# Patient Record
Sex: MALE
[De-identification: names, ages, dates, MRNs, and addresses within clinical notes are randomized per-mention and may not be internally consistent; named-entity substitution may affect disease eponyms.]

---

## 2019-04-24 ENCOUNTER — HOSPITAL ENCOUNTER (EMERGENCY)
Dept: HOSPITAL 14 - H.ER | Age: 29
Discharge: HOME | End: 2019-04-24
Payer: COMMERCIAL

## 2019-04-24 VITALS
RESPIRATION RATE: 14 BRPM | HEART RATE: 67 BPM | SYSTOLIC BLOOD PRESSURE: 115 MMHG | TEMPERATURE: 98.8 F | DIASTOLIC BLOOD PRESSURE: 66 MMHG

## 2019-04-24 VITALS — OXYGEN SATURATION: 99 %

## 2019-04-24 DIAGNOSIS — R07.89: Primary | ICD-10-CM

## 2019-04-24 LAB
ALBUMIN SERPL-MCNC: 4.7 G/DL (ref 3.5–5)
ALBUMIN/GLOB SERPL: 1.8 {RATIO} (ref 1–2.1)
ALT SERPL-CCNC: 38 U/L (ref 21–72)
AST SERPL-CCNC: 36 U/L (ref 17–59)
BASOPHILS # BLD AUTO: 0 K/UL (ref 0–0.2)
BASOPHILS NFR BLD: 0.5 % (ref 0–2)
BUN SERPL-MCNC: 16 MG/DL (ref 9–20)
CALCIUM SERPL-MCNC: 10.1 MG/DL (ref 8.4–10.2)
EOSINOPHIL # BLD AUTO: 0.1 K/UL (ref 0–0.7)
EOSINOPHIL NFR BLD: 2.1 % (ref 0–4)
ERYTHROCYTE [DISTWIDTH] IN BLOOD BY AUTOMATED COUNT: 13.5 % (ref 11.5–14.5)
GFR NON-AFRICAN AMERICAN: > 60
HGB BLD-MCNC: 14.5 G/DL (ref 12–18)
LYMPHOCYTES # BLD AUTO: 1.5 K/UL (ref 1–4.3)
LYMPHOCYTES NFR BLD AUTO: 27.1 % (ref 20–40)
MCH RBC QN AUTO: 29.9 PG (ref 27–31)
MCHC RBC AUTO-ENTMCNC: 34.1 G/DL (ref 33–37)
MCV RBC AUTO: 87.7 FL (ref 80–94)
MONOCYTES # BLD: 0.4 K/UL (ref 0–0.8)
MONOCYTES NFR BLD: 6.9 % (ref 0–10)
NEUTROPHILS # BLD: 3.5 K/UL (ref 1.8–7)
NEUTROPHILS NFR BLD AUTO: 63.4 % (ref 50–75)
NRBC BLD AUTO-RTO: 0 % (ref 0–0)
PLATELET # BLD: 174 K/UL (ref 130–400)
PMV BLD AUTO: 8.7 FL (ref 7.2–11.7)
RBC # BLD AUTO: 4.84 MIL/UL (ref 4.4–5.9)
WBC # BLD AUTO: 5.5 K/UL (ref 4.8–10.8)

## 2019-04-24 NOTE — ED PDOC
HPI: Chest Pain


Time Seen by Provider: 04/24/19 21:43


Chief Complaint (Nursing): Chest Pain


Chief Complaint (Provider): chest pain


History Per: Patient


History/Exam Limitations: no limitations


Onset/Duration Of Symptoms: Days (2), Waxing/Waning


Current Symptoms Are (Timing): Still Present


Quality: Sharp


Additional Complaint(s): 





27 y/o male presents for evaluation of intermittent left-sided chest pain x 2 

days.  Patient states pain is sharp and quick when present.  Denies fever, 

headache, dizziness, vomiting, shortness of breath, palpitations, abdominal 

pain, leg pain/swelling, recent travel.  Patient states he took two ibuprofen 

prior to arrival and has not had the pain since











Past Medical History


Reviewed: Historical Data, Nursing Documentation, Vital Signs


Vital Signs: 





                                Last Vital Signs











Temp  98.0 F   04/24/19 21:38


 


Pulse  68   04/24/19 21:38


 


Resp  16   04/24/19 21:38


 


BP  133/82   04/24/19 21:38


 


Pulse Ox  99   04/24/19 21:38














- Medical History


PMH: No Chronic Diseases





- Surgical History


Surgical History: No Surg Hx





- Family History


Family History: States: No Known Family Hx





- Living Arrangements


Living Arrangements: With Family





- Allergies


Allergies/Adverse Reactions: 


                                    Allergies











Allergy/AdvReac Type Severity Reaction Status Date / Time


 


cefaclor [From Ceclor] Allergy  RASH Verified 04/24/19 21:38


 


cefixime [From Suprax] Allergy  RASH Verified 04/24/19 21:38














Review of Systems


ROS Statement: Except As Marked, All Systems Reviewed And Found Negative


Cardiovascular: Positive for: Chest Pain





Physical Exam





- Reviewed


Nursing Documentation Reviewed: Yes


Vital Signs Reviewed: Yes





- Physical Exam


Appears: Positive for: Well, Non-toxic, No Acute Distress


Head Exam: Positive for: ATRAUMATIC, NORMAL INSPECTION, NORMOCEPHALIC


Skin: Positive for: Normal Color


Eye Exam: Positive for: Normal appearance


ENT: Positive for: Normal ENT Inspection


Cardiovascular/Chest: Positive for: Regular Rate, Rhythm


Respiratory: Positive for: Normal Breath Sounds


Gastrointestinal/Abdominal: Positive for: Normal Exam


Back: Positive for: Normal Inspection


Extremity: Positive for: Normal ROM


Neurological/Psych: Positive for: Awake, Alert, Oriented (x3)





- Laboratory Results


Result Diagrams: 


                                 04/24/19 22:32





                                 04/24/19 22:32





- ECG


ECG: Positive for: Viewed By Me (reviwed by ED attending)


ECG Rhythm: Positive for: Sinus Rhythm


O2 Sat by Pulse Oximetry: 99





- Radiology


X-Ray: Viewed By Me


X-Ray Interpretation: No Acute Disease





- Progress


ED Course And Treament: 





-cbc


-cmp


-troponin


-tsh


-ekg


-cxr


-cardiac monitor





Patient educated on findings, discharged with instructions to follow up with PMD

within 2-3 days


Return precautions given














Disposition





- Clinical Impression


Clinical Impression: 


 Chest pain








- Patient ED Disposition


Is Patient to be Admitted: No


Counseled Patient/Family Regarding: Studies Performed, Diagnosis, Need For 

Followup





- Disposition


Disposition: Routine/Home


Disposition Time: 23:21


Condition: IMPROVED


Instructions:  Chest Pain


Forms:  [facility] (English)

## 2019-04-25 NOTE — RAD
Date of service: 



04/24/2019



HISTORY:

 chest pain 



COMPARISON:

No prior.



TECHNIQUE:

Chest PA and lateral views



FINDINGS:



LUNGS:

No active pulmonary disease.



PLEURA:

No significant pleural effusion identified. No pneumothorax apparent.



CARDIOVASCULAR:

No aortic atherosclerotic calcification present.



Normal cardiac size. No pulmonary vascular congestion. 



OSSEOUS STRUCTURES:

No significant abnormalities.



VISUALIZED UPPER ABDOMEN:

Normal.



OTHER FINDINGS:

None.



IMPRESSION:

No active disease.

## 2019-04-25 NOTE — CARD
--------------- APPROVED REPORT --------------





Date of service: 04/24/2019



EKG Measurement

Heart [id number]

AL 136P-5

[id number]

[id number]

[id number]



<Conclusion>

Normal sinus rhythm

Normal ECG